# Patient Record
Sex: FEMALE | Race: WHITE
[De-identification: names, ages, dates, MRNs, and addresses within clinical notes are randomized per-mention and may not be internally consistent; named-entity substitution may affect disease eponyms.]

---

## 2018-10-25 ENCOUNTER — HOSPITAL ENCOUNTER (OUTPATIENT)
Dept: HOSPITAL 92 - TBSIIMAG | Age: 83
Discharge: HOME | End: 2018-10-25
Attending: NEUROLOGICAL SURGERY
Payer: MEDICARE

## 2018-10-25 DIAGNOSIS — S12.9XXA: Primary | ICD-10-CM

## 2018-10-25 DIAGNOSIS — M47.892: ICD-10-CM

## 2018-10-25 PROCEDURE — 72040 X-RAY EXAM NECK SPINE 2-3 VW: CPT

## 2018-10-25 NOTE — RAD
THREE VIEWS CERVICAL SPINE:

 

History: Fall. Neck pain. 

 

FINDINGS: 

AP, lateral, and open mouth odontoid view obtained. There is mild anterolisthesis of C5 on C6 and C6 
on C7. Disc space height loss and anterior and posterior osteophytes seen at C4-5, C5-6, and C6-7.

 

No evidence of prevertebral soft tissue swelling is seen. The odontoid and lateral masses of C1 and C
2 are unremarkable. 

 

IMPRESSION: 

Mid cervical changes of spondylosis. 

 

POS: [facility]